# Patient Record
Sex: FEMALE | Race: WHITE | NOT HISPANIC OR LATINO
[De-identification: names, ages, dates, MRNs, and addresses within clinical notes are randomized per-mention and may not be internally consistent; named-entity substitution may affect disease eponyms.]

---

## 2024-03-11 DIAGNOSIS — E03.9 HYPOTHYROIDISM, UNSPECIFIED: ICD-10-CM

## 2024-03-12 ENCOUNTER — APPOINTMENT (OUTPATIENT)
Dept: CARDIOLOGY | Facility: CLINIC | Age: 79
End: 2024-03-12
Payer: MEDICARE

## 2024-03-12 VITALS
WEIGHT: 115 LBS | HEART RATE: 63 BPM | SYSTOLIC BLOOD PRESSURE: 165 MMHG | DIASTOLIC BLOOD PRESSURE: 77 MMHG | OXYGEN SATURATION: 99 % | BODY MASS INDEX: 26.61 KG/M2 | HEIGHT: 55 IN

## 2024-03-12 DIAGNOSIS — I10 ESSENTIAL (PRIMARY) HYPERTENSION: ICD-10-CM

## 2024-03-12 DIAGNOSIS — E78.5 HYPERLIPIDEMIA, UNSPECIFIED: ICD-10-CM

## 2024-03-12 PROCEDURE — 99213 OFFICE O/P EST LOW 20 MIN: CPT | Mod: 25

## 2024-03-12 PROCEDURE — 93000 ELECTROCARDIOGRAM COMPLETE: CPT

## 2024-03-12 RX ORDER — ALPRAZOLAM 0.5 MG/1
0.5 TABLET ORAL
Refills: 0 | Status: ACTIVE | COMMUNITY
Start: 2024-03-12

## 2024-03-12 RX ORDER — VALSARTAN 160 MG/1
160 TABLET, COATED ORAL
Qty: 90 | Refills: 3 | Status: ACTIVE | COMMUNITY
Start: 2024-03-12 | End: 1900-01-01

## 2024-03-12 RX ORDER — METOPROLOL SUCCINATE 25 MG/1
25 TABLET, EXTENDED RELEASE ORAL
Qty: 90 | Refills: 3 | Status: ACTIVE | COMMUNITY
Start: 2024-03-12

## 2024-03-12 RX ORDER — SERTRALINE HYDROCHLORIDE 50 MG/1
50 TABLET, FILM COATED ORAL DAILY
Refills: 0 | Status: ACTIVE | COMMUNITY
Start: 2024-03-12

## 2024-05-12 PROBLEM — E78.5 HYPERLIPIDEMIA: Status: ACTIVE | Noted: 2024-03-11

## 2024-05-12 PROBLEM — I10 BENIGN ESSENTIAL HYPERTENSION: Status: ACTIVE | Noted: 2024-03-11

## 2024-05-12 NOTE — HISTORY OF PRESENT ILLNESS
[FreeTextEntry1] : Here to consult re: HTN Dealing with stressful home situation with  and dementia No CP No LE edema No palps

## 2024-05-12 NOTE — DISCUSSION/SUMMARY
[FreeTextEntry1] : titrate valsartan discussed low salt diet [EKG obtained to assist in diagnosis and management of assessed problem(s)] : EKG obtained to assist in diagnosis and management of assessed problem(s)

## 2024-06-06 ENCOUNTER — TRANSCRIPTION ENCOUNTER (OUTPATIENT)
Age: 79
End: 2024-06-06

## 2024-06-06 ENCOUNTER — INPATIENT (INPATIENT)
Facility: HOSPITAL | Age: 79
LOS: 1 days | Discharge: HOME | DRG: 880 | End: 2024-06-08
Attending: INTERNAL MEDICINE | Admitting: INTERNAL MEDICINE
Payer: MEDICARE

## 2024-06-06 VITALS
TEMPERATURE: 99 F | RESPIRATION RATE: 17 BRPM | DIASTOLIC BLOOD PRESSURE: 80 MMHG | HEART RATE: 63 BPM | OXYGEN SATURATION: 99 % | SYSTOLIC BLOOD PRESSURE: 152 MMHG

## 2024-06-06 DIAGNOSIS — R41.82 ALTERED MENTAL STATUS, UNSPECIFIED: ICD-10-CM

## 2024-06-06 DIAGNOSIS — R41.89 OTHER SYMPTOMS AND SIGNS INVOLVING COGNITIVE FUNCTIONS AND AWARENESS: ICD-10-CM

## 2024-06-06 LAB
ALBUMIN SERPL ELPH-MCNC: 3.9 G/DL — SIGNIFICANT CHANGE UP (ref 3.3–5)
ALP SERPL-CCNC: 93 U/L — SIGNIFICANT CHANGE UP (ref 40–120)
ALT FLD-CCNC: 18 U/L — SIGNIFICANT CHANGE UP (ref 10–45)
ANION GAP SERPL CALC-SCNC: 14 MMOL/L — SIGNIFICANT CHANGE UP (ref 5–17)
APPEARANCE UR: CLEAR — SIGNIFICANT CHANGE UP
AST SERPL-CCNC: 22 U/L — SIGNIFICANT CHANGE UP (ref 10–40)
BACTERIA # UR AUTO: NEGATIVE /HPF — SIGNIFICANT CHANGE UP
BASE EXCESS BLDV CALC-SCNC: -1.2 MMOL/L — SIGNIFICANT CHANGE UP (ref -2–3)
BASOPHILS # BLD AUTO: 0.07 K/UL — SIGNIFICANT CHANGE UP (ref 0–0.2)
BASOPHILS NFR BLD AUTO: 0.9 % — SIGNIFICANT CHANGE UP (ref 0–2)
BILIRUB SERPL-MCNC: 0.2 MG/DL — SIGNIFICANT CHANGE UP (ref 0.2–1.2)
BILIRUB UR-MCNC: NEGATIVE — SIGNIFICANT CHANGE UP
BUN SERPL-MCNC: 18 MG/DL — SIGNIFICANT CHANGE UP (ref 7–23)
CA-I SERPL-SCNC: 1.25 MMOL/L — SIGNIFICANT CHANGE UP (ref 1.15–1.33)
CALCIUM SERPL-MCNC: 9.6 MG/DL — SIGNIFICANT CHANGE UP (ref 8.4–10.5)
CAST: 0 /LPF — SIGNIFICANT CHANGE UP (ref 0–4)
CHLORIDE BLDV-SCNC: 105 MMOL/L — SIGNIFICANT CHANGE UP (ref 96–108)
CHLORIDE SERPL-SCNC: 107 MMOL/L — SIGNIFICANT CHANGE UP (ref 96–108)
CO2 BLDV-SCNC: 25 MMOL/L — SIGNIFICANT CHANGE UP (ref 22–26)
CO2 SERPL-SCNC: 19 MMOL/L — LOW (ref 22–31)
COLOR SPEC: YELLOW — SIGNIFICANT CHANGE UP
CREAT SERPL-MCNC: 0.6 MG/DL — SIGNIFICANT CHANGE UP (ref 0.5–1.3)
DIFF PNL FLD: NEGATIVE — SIGNIFICANT CHANGE UP
EGFR: 91 ML/MIN/1.73M2 — SIGNIFICANT CHANGE UP
EOSINOPHIL # BLD AUTO: 0.15 K/UL — SIGNIFICANT CHANGE UP (ref 0–0.5)
EOSINOPHIL NFR BLD AUTO: 2 % — SIGNIFICANT CHANGE UP (ref 0–6)
FLUAV AG NPH QL: SIGNIFICANT CHANGE UP
FLUBV AG NPH QL: SIGNIFICANT CHANGE UP
GAS PNL BLDV: 136 MMOL/L — SIGNIFICANT CHANGE UP (ref 136–145)
GAS PNL BLDV: SIGNIFICANT CHANGE UP
GAS PNL BLDV: SIGNIFICANT CHANGE UP
GLUCOSE BLDV-MCNC: 86 MG/DL — SIGNIFICANT CHANGE UP (ref 70–99)
GLUCOSE SERPL-MCNC: 95 MG/DL — SIGNIFICANT CHANGE UP (ref 70–99)
GLUCOSE UR QL: NEGATIVE MG/DL — SIGNIFICANT CHANGE UP
HCO3 BLDV-SCNC: 24 MMOL/L — SIGNIFICANT CHANGE UP (ref 22–29)
HCT VFR BLD CALC: 42.5 % — SIGNIFICANT CHANGE UP (ref 34.5–45)
HCT VFR BLDA CALC: 41 % — SIGNIFICANT CHANGE UP (ref 34.5–46.5)
HGB BLD CALC-MCNC: 13.6 G/DL — SIGNIFICANT CHANGE UP (ref 11.7–16.1)
HGB BLD-MCNC: 13.8 G/DL — SIGNIFICANT CHANGE UP (ref 11.5–15.5)
IMM GRANULOCYTES NFR BLD AUTO: 0.5 % — SIGNIFICANT CHANGE UP (ref 0–0.9)
KETONES UR-MCNC: NEGATIVE MG/DL — SIGNIFICANT CHANGE UP
LACTATE BLDV-MCNC: 0.9 MMOL/L — SIGNIFICANT CHANGE UP (ref 0.5–2)
LEUKOCYTE ESTERASE UR-ACNC: NEGATIVE — SIGNIFICANT CHANGE UP
LYMPHOCYTES # BLD AUTO: 1.56 K/UL — SIGNIFICANT CHANGE UP (ref 1–3.3)
LYMPHOCYTES # BLD AUTO: 20.7 % — SIGNIFICANT CHANGE UP (ref 13–44)
MCHC RBC-ENTMCNC: 31.7 PG — SIGNIFICANT CHANGE UP (ref 27–34)
MCHC RBC-ENTMCNC: 32.5 GM/DL — SIGNIFICANT CHANGE UP (ref 32–36)
MCV RBC AUTO: 97.7 FL — SIGNIFICANT CHANGE UP (ref 80–100)
MONOCYTES # BLD AUTO: 0.83 K/UL — SIGNIFICANT CHANGE UP (ref 0–0.9)
MONOCYTES NFR BLD AUTO: 11 % — SIGNIFICANT CHANGE UP (ref 2–14)
NEUTROPHILS # BLD AUTO: 4.9 K/UL — SIGNIFICANT CHANGE UP (ref 1.8–7.4)
NEUTROPHILS NFR BLD AUTO: 64.9 % — SIGNIFICANT CHANGE UP (ref 43–77)
NITRITE UR-MCNC: NEGATIVE — SIGNIFICANT CHANGE UP
NRBC # BLD: 0 /100 WBCS — SIGNIFICANT CHANGE UP (ref 0–0)
PCO2 BLDV: 39 MMHG — SIGNIFICANT CHANGE UP (ref 39–42)
PH BLDV: 7.39 — SIGNIFICANT CHANGE UP (ref 7.32–7.43)
PH UR: 5.5 — SIGNIFICANT CHANGE UP (ref 5–8)
PLATELET # BLD AUTO: 326 K/UL — SIGNIFICANT CHANGE UP (ref 150–400)
PO2 BLDV: 60 MMHG — HIGH (ref 25–45)
POTASSIUM BLDV-SCNC: 3.9 MMOL/L — SIGNIFICANT CHANGE UP (ref 3.5–5.1)
POTASSIUM SERPL-MCNC: 3.8 MMOL/L — SIGNIFICANT CHANGE UP (ref 3.5–5.3)
POTASSIUM SERPL-SCNC: 3.8 MMOL/L — SIGNIFICANT CHANGE UP (ref 3.5–5.3)
PROT SERPL-MCNC: 7 G/DL — SIGNIFICANT CHANGE UP (ref 6–8.3)
PROT UR-MCNC: NEGATIVE MG/DL — SIGNIFICANT CHANGE UP
RBC # BLD: 4.35 M/UL — SIGNIFICANT CHANGE UP (ref 3.8–5.2)
RBC # FLD: 13.2 % — SIGNIFICANT CHANGE UP (ref 10.3–14.5)
RBC CASTS # UR COMP ASSIST: 0 /HPF — SIGNIFICANT CHANGE UP (ref 0–4)
RSV RNA NPH QL NAA+NON-PROBE: SIGNIFICANT CHANGE UP
SAO2 % BLDV: 92.3 % — HIGH (ref 67–88)
SARS-COV-2 RNA SPEC QL NAA+PROBE: SIGNIFICANT CHANGE UP
SODIUM SERPL-SCNC: 140 MMOL/L — SIGNIFICANT CHANGE UP (ref 135–145)
SP GR SPEC: 1.02 — SIGNIFICANT CHANGE UP (ref 1–1.03)
SQUAMOUS # UR AUTO: 0 /HPF — SIGNIFICANT CHANGE UP (ref 0–5)
UROBILINOGEN FLD QL: 1 MG/DL — SIGNIFICANT CHANGE UP (ref 0.2–1)
WBC # BLD: 7.55 K/UL — SIGNIFICANT CHANGE UP (ref 3.8–10.5)
WBC # FLD AUTO: 7.55 K/UL — SIGNIFICANT CHANGE UP (ref 3.8–10.5)
WBC UR QL: 1 /HPF — SIGNIFICANT CHANGE UP (ref 0–5)

## 2024-06-06 PROCEDURE — 99285 EMERGENCY DEPT VISIT HI MDM: CPT

## 2024-06-06 PROCEDURE — 71045 X-RAY EXAM CHEST 1 VIEW: CPT | Mod: 26

## 2024-06-06 PROCEDURE — 99223 1ST HOSP IP/OBS HIGH 75: CPT

## 2024-06-06 PROCEDURE — 70450 CT HEAD/BRAIN W/O DYE: CPT | Mod: 26,MC

## 2024-06-06 RX ORDER — ALPRAZOLAM 0.25 MG
0.25 TABLET ORAL ONCE
Refills: 0 | Status: DISCONTINUED | OUTPATIENT
Start: 2024-06-06 | End: 2024-06-06

## 2024-06-06 RX ORDER — METOPROLOL TARTRATE 50 MG
25 TABLET ORAL ONCE
Refills: 0 | Status: COMPLETED | OUTPATIENT
Start: 2024-06-06 | End: 2024-06-06

## 2024-06-06 RX ORDER — VALSARTAN 80 MG/1
160 TABLET ORAL ONCE
Refills: 0 | Status: COMPLETED | OUTPATIENT
Start: 2024-06-06 | End: 2024-06-06

## 2024-06-06 RX ADMIN — Medication 25 MILLIGRAM(S): at 22:47

## 2024-06-06 RX ADMIN — VALSARTAN 160 MILLIGRAM(S): 80 TABLET ORAL at 22:47

## 2024-06-06 RX ADMIN — Medication 0.25 MILLIGRAM(S): at 22:47

## 2024-06-06 NOTE — ED PROVIDER NOTE - QRS
92
I personally performed the service described in the documentation  recorded by the scribe in my presence, and it accurately and completely records my words and action.

## 2024-06-06 NOTE — H&P ADULT - PROBLEM SELECTOR PLAN 1
-dementia vs. metabolic encephalopathy.   -likely dementia given chronic ischemic changes on CTH, progressively worsening cognitive impairment. No infectious s/s and cbc and ua neg.   -monitor for now. -dementia vs. metabolic encephalopathy.   -likely dementia given chronic ischemic changes on CTH, progressively worsening cognitive impairment. No infectious s/s and cbc and ua neg.   -monitor for now.  -f/u TSH

## 2024-06-06 NOTE — ED ADULT NURSE REASSESSMENT NOTE - NS ED NURSE REASSESS COMMENT FT1
Patient found in stretcher breathing spontaneously and unlabored on Room Air. No signs of respiratory distress @ this time. The patient is alert and orientated x4 and responds appropriately. The patient presents with a Left 20G PIV that is C/D/I and saline locked @ this time. Pt denies chest pain, palpitations, shortness of breath, headache, visual disturbances, numbness/tingling, fever, chills, diaphoresis,  nausea, vomiting, constipation, diarrhea, or urinary symptoms. Safety and comfort measures provided, bed locked and in lowest position, side rails up for safety. VS to order and Awaiting return from CT Scan.
Rectal temp obtained, 99.1. Pt repositioned to facilitate comfort.

## 2024-06-06 NOTE — ED PROVIDER NOTE - RAPID ASSESSMENT
79-year-old female brought into the emergency department by her family for worsening altered mental status over the last 3 days.  According to family she normally is very with it however has been questioning whether or not she has been taking her medication seems more confused which is very atypical for her. 79-year-old female brought into the emergency department by her family for worsening altered mental status over the last 3 days.  According to family she normally is very with it however has been questioning whether or not she has been taking her medication seems more confused which is very atypical for her.  Patient was rapidly assessed via a telemedicine and/or role of Quick Triage Doctor; a limited history, physical exam and assessment was performed. The patient will be seen and further evaluated in the main emergency department. The remainder of care and evaluation will be conducted by the primary emergency medicine team. Receiving team will follow up on labs, imaging and serially reassess patient as indicated. All further decisions regarding patient care, evaluation and disposition are at the discretion of the receiving primary emergency department team.

## 2024-06-06 NOTE — H&P ADULT - HISTORY OF PRESENT ILLNESS
79F PMHx HTN, anxiety. Brought in by family for AMS for the past few months, acutely worsened past 3days; she intermittently gets confused and does not remember what happened.     In the ED, VSS.   CBC. coag, cmp, UA unremarkable. limited rvp neg. CTH w/ chronic ischemic changes, no acute path. CXR clear lungs.  79F PMHx HTN, anxiety. Brought in by family for AMS for the past few months, acutely worsened past 3days; she intermittently gets confused and does not remember what happened.     In the ED, VSS.   CBC. coag, cmp, UA unremarkable. limited rvp neg. CTH w/ chronic ischemic changes, no acute path. CXR clear lungs.     On interview, pt is not in distress. Makes eye contact but is not very communicative, answering yes and no only.

## 2024-06-06 NOTE — ED PROVIDER NOTE - PROGRESS NOTE DETAILS
Pascual Liang MD: EKG performed at 4:34 PM personally reviewed by me, showing sinus arrhythmia with first degree AV block with a ventricular rate of 62 bpm, AR interval of 248 ms, QRS duration of 92 ms, QTc (Baz) of 462 ms. there are no ST segment changes and no T wave abnormalities. Aristeo Liang MD: Case discussed with Dr. Cleveland, he is comfortable with patient being admitted to his service as long as there are no significant abnormalities on the CT head or the blood gas. Maurice Blanco MD PGY2: pt signed out to me at signout. cth now no acute findings. dw dr leahy, to admit. lashaun pt and family, to give home nighttime meds.

## 2024-06-06 NOTE — H&P ADULT - NSHPPHYSICALEXAM_GEN_ALL_CORE
Vital Signs Last 24 Hrs  T(C): 37 (06 Jun 2024 23:45), Max: 37.3 (06 Jun 2024 17:33)  T(F): 98.6 (06 Jun 2024 23:45), Max: 99.1 (06 Jun 2024 17:33)  HR: 54 (06 Jun 2024 23:45) (54 - 66)  BP: 145/71 (06 Jun 2024 23:45) (130/57 - 152/80)  BP(mean): 79 (06 Jun 2024 22:45) (79 - 99)  RR: 18 (06 Jun 2024 23:45) (16 - 19)  SpO2: 96% (06 Jun 2024 23:45) (96% - 100%)    Parameters below as of 06 Jun 2024 23:45  Patient On (Oxygen Delivery Method): room air        CONSTITUTIONAL: Well-groomed, in no apparent distress  EYES: No conjunctival or scleral injection, non-icteric  ENMT: No external nasal lesions; MMM  RESPIRATORY: Breathing comfortably; lungs CTA without wheeze/rhonchi/rales  CARDIOVASCULAR: +S1S2, RRR; no lower extremity edema  GASTROINTESTINAL: No tenderness, +BS throughout, no rebound/guarding  NEUROLOGIC: No gross focal neurological deficits, alert and answers questions with yes/no, follows commands.

## 2024-06-06 NOTE — ED PROVIDER NOTE - CLINICAL SUMMARY MEDICAL DECISION MAKING FREE TEXT BOX
79-year-old female here with waxing and waning mental status for several months.  She was evaluated by the triage PA, initial labs are unremarkable.  Will obtain rectal temperature, check EKG as she has apparent arrhythmia on the cardiac monitor.  Will obtain CT head, will speak with primary doctor. 79-year-old female here with waxing and waning mental status for several months.  She was evaluated by the triage PA, initial labs are unremarkable.  Will obtain rectal temperature, check EKG as she has apparent arrhythmia on the cardiac monitor.  Will obtain CT head, will speak with primary doctor.    Attending Baldemar: 78 y/o F w/ PMH of HTN, anxiety, presents emergency department due to worsening mental status for the past several months. Seen w/ son and daughter. Family states that she intermittently gets confused, does not remember what just happened.  She has no measured fevers at home.  She reports no specific symptoms at this time including chest pain, nausea, vomiting, abdominal pain, shortness of breath. Pt overall well appearing, no acute distress. Lungs clear. HR regular. Abd nondistended/soft/nontender. A&O x3, able to make needs known. Non focal neuro exam. Eval for metabolic vs. infectious abnormalities. Obtain screening CTH to eval for acute intracrnial pathology. Reportedly referred in by PCP Dr. Cleveland, will speak w/ him. Plan for labs, imaging, EKG, meds PRN. Will reassess the need for additional interventions as clinically warranted. Refer to any progress notes for updates on clinical course and as a continuation of this MDM.     I, Dr. Christiano Mcdermott, independently interpreted the EKG which showed Sinus arrhythmia w/ 1st degree AV block at a rate of 62.

## 2024-06-06 NOTE — ED ADULT NURSE NOTE - OBJECTIVE STATEMENT
Pt presents to ED from home. Pt complains of, "feeling confused xlast few days and feeling out of it". Pt has daughter and son at bedside that expressed concerns for pt claiming, "she is very forgetful lately, forgetting about taking her medication, which is unlike her". Pt denies chest pain, chest palpitations, SOB, n/v/d, fever, chills, difficulty urinating. Pt presents as AAOx4- answering all questions correctly, but is anxious. Pt placed on cardiac monitor, NSR. Plan of care and monitoring discussed with pt. Bed locked and lowered for safety. Pt provided with call bell for assistance.

## 2024-06-06 NOTE — ED PROVIDER NOTE - PHYSICAL EXAMINATION
General: no apparent distress  Psych: mood appropriate  Head: normocephalic; atraumatic  Eyes: conjunctivae clear bilaterally, sclerae anicteric  ENT: no nasal flaring, patent nares  Cardio: RRR, no m/r/g, pulses 2+ b/l  Resp: CATB, no w/r/r  GI: soft/nondistended/nontender  Neuro: normal sensation, moving all four extremities equally  Skin: No evidence of rash or bruising  MSK: normal movement of all extremities  Lymph/Vasc: no LE edema

## 2024-06-06 NOTE — ED PROVIDER NOTE - ATTENDING CONTRIBUTION TO CARE
Attending Baldemar: I performed a history and physical exam of the patient and discussed their management with the resident/fellow/student. I have reviewed the resident/fellow/student note and agree with the documented findings and plan of care, except as noted. I have personally performed a substantive portion of the visit including all aspects of the medical decision making. My medical decision making and observations are found above. Please refer to any progress notes for updates on clinical course. My notes supersedes the above resident/fellow/student note in case of discrepancy

## 2024-06-06 NOTE — H&P ADULT - NSHPLABSRESULTS_GEN_ALL_CORE
13.8   7.55  )-----------( 326      ( 2024 15:18 )             42.5       -    140  |  107  |  18  ----------------------------<  95  3.8   |  19<L>  |  0.60    Ca    9.6      2024 15:18    TPro  7.0  /  Alb  3.9  /  TBili  0.2  /  DBili  x   /  AST  22  /  ALT  18  /  AlkPhos  93  06-06              Urinalysis Basic - ( 2024 15:19 )    Color: Yellow / Appearance: Clear / S.019 / pH: x  Gluc: x / Ketone: Negative mg/dL  / Bili: Negative / Urobili: 1.0 mg/dL   Blood: x / Protein: Negative mg/dL / Nitrite: Negative   Leuk Esterase: Negative / RBC: 0 /HPF / WBC 1 /HPF   Sq Epi: x / Non Sq Epi: 0 /HPF / Bacteria: Negative /HPF                  CAPILLARY BLOOD GLUCOSE

## 2024-06-06 NOTE — ED PROVIDER NOTE - OBJECTIVE STATEMENT
79-year-old female with past medical history of hypertension, anxiety, presents emergency department due to worsening mental status for the past several months.  Family states that she intermittently gets confused, does not remember what just happened.  She has no measured fevers at home.  She reports no symptoms at this time including chest pain, nausea, vomiting, abdominal pain, shortness of breath.

## 2024-06-07 ENCOUNTER — RESULT REVIEW (OUTPATIENT)
Age: 79
End: 2024-06-07

## 2024-06-07 DIAGNOSIS — Z29.9 ENCOUNTER FOR PROPHYLACTIC MEASURES, UNSPECIFIED: ICD-10-CM

## 2024-06-07 DIAGNOSIS — E78.5 HYPERLIPIDEMIA, UNSPECIFIED: ICD-10-CM

## 2024-06-07 DIAGNOSIS — I10 ESSENTIAL (PRIMARY) HYPERTENSION: ICD-10-CM

## 2024-06-07 DIAGNOSIS — E03.9 HYPOTHYROIDISM, UNSPECIFIED: ICD-10-CM

## 2024-06-07 LAB
ANION GAP SERPL CALC-SCNC: 12 MMOL/L — SIGNIFICANT CHANGE UP (ref 5–17)
BUN SERPL-MCNC: 16 MG/DL — SIGNIFICANT CHANGE UP (ref 7–23)
CALCIUM SERPL-MCNC: 9.4 MG/DL — SIGNIFICANT CHANGE UP (ref 8.4–10.5)
CHLORIDE SERPL-SCNC: 107 MMOL/L — SIGNIFICANT CHANGE UP (ref 96–108)
CO2 SERPL-SCNC: 22 MMOL/L — SIGNIFICANT CHANGE UP (ref 22–31)
CREAT SERPL-MCNC: 0.57 MG/DL — SIGNIFICANT CHANGE UP (ref 0.5–1.3)
CULTURE RESULTS: SIGNIFICANT CHANGE UP
EGFR: 92 ML/MIN/1.73M2 — SIGNIFICANT CHANGE UP
GLUCOSE SERPL-MCNC: 88 MG/DL — SIGNIFICANT CHANGE UP (ref 70–99)
HCT VFR BLD CALC: 42.1 % — SIGNIFICANT CHANGE UP (ref 34.5–45)
HGB BLD-MCNC: 13.4 G/DL — SIGNIFICANT CHANGE UP (ref 11.5–15.5)
MAGNESIUM SERPL-MCNC: 2.4 MG/DL — SIGNIFICANT CHANGE UP (ref 1.6–2.6)
MCHC RBC-ENTMCNC: 31.5 PG — SIGNIFICANT CHANGE UP (ref 27–34)
MCHC RBC-ENTMCNC: 31.8 GM/DL — LOW (ref 32–36)
MCV RBC AUTO: 99.1 FL — SIGNIFICANT CHANGE UP (ref 80–100)
NRBC # BLD: 0 /100 WBCS — SIGNIFICANT CHANGE UP (ref 0–0)
PHOSPHATE SERPL-MCNC: 3.3 MG/DL — SIGNIFICANT CHANGE UP (ref 2.5–4.5)
PLATELET # BLD AUTO: 317 K/UL — SIGNIFICANT CHANGE UP (ref 150–400)
POTASSIUM SERPL-MCNC: 3.7 MMOL/L — SIGNIFICANT CHANGE UP (ref 3.5–5.3)
POTASSIUM SERPL-SCNC: 3.7 MMOL/L — SIGNIFICANT CHANGE UP (ref 3.5–5.3)
RBC # BLD: 4.25 M/UL — SIGNIFICANT CHANGE UP (ref 3.8–5.2)
RBC # FLD: 13.3 % — SIGNIFICANT CHANGE UP (ref 10.3–14.5)
SODIUM SERPL-SCNC: 141 MMOL/L — SIGNIFICANT CHANGE UP (ref 135–145)
SPECIMEN SOURCE: SIGNIFICANT CHANGE UP
TSH SERPL-MCNC: 3.55 UIU/ML — SIGNIFICANT CHANGE UP (ref 0.27–4.2)
WBC # BLD: 7.85 K/UL — SIGNIFICANT CHANGE UP (ref 3.8–10.5)
WBC # FLD AUTO: 7.85 K/UL — SIGNIFICANT CHANGE UP (ref 3.8–10.5)

## 2024-06-07 PROCEDURE — 70551 MRI BRAIN STEM W/O DYE: CPT | Mod: 26

## 2024-06-07 PROCEDURE — 93306 TTE W/DOPPLER COMPLETE: CPT | Mod: 26

## 2024-06-07 PROCEDURE — 99221 1ST HOSP IP/OBS SF/LOW 40: CPT

## 2024-06-07 PROCEDURE — 95816 EEG AWAKE AND DROWSY: CPT | Mod: 26

## 2024-06-07 RX ORDER — ALPRAZOLAM 0.25 MG
0.25 TABLET ORAL AT BEDTIME
Refills: 0 | Status: DISCONTINUED | OUTPATIENT
Start: 2024-06-07 | End: 2024-06-07

## 2024-06-07 RX ORDER — ALPRAZOLAM 0.25 MG
0.5 TABLET ORAL
Refills: 0 | Status: DISCONTINUED | OUTPATIENT
Start: 2024-06-07 | End: 2024-06-07

## 2024-06-07 RX ORDER — ONDANSETRON 8 MG/1
4 TABLET, FILM COATED ORAL EVERY 8 HOURS
Refills: 0 | Status: DISCONTINUED | OUTPATIENT
Start: 2024-06-07 | End: 2024-06-08

## 2024-06-07 RX ORDER — ACETAMINOPHEN 500 MG
650 TABLET ORAL EVERY 6 HOURS
Refills: 0 | Status: DISCONTINUED | OUTPATIENT
Start: 2024-06-07 | End: 2024-06-08

## 2024-06-07 RX ORDER — SERTRALINE 25 MG/1
50 TABLET, FILM COATED ORAL DAILY
Refills: 0 | Status: DISCONTINUED | OUTPATIENT
Start: 2024-06-07 | End: 2024-06-08

## 2024-06-07 RX ORDER — ATORVASTATIN CALCIUM 80 MG/1
20 TABLET, FILM COATED ORAL AT BEDTIME
Refills: 0 | Status: DISCONTINUED | OUTPATIENT
Start: 2024-06-07 | End: 2024-06-08

## 2024-06-07 RX ORDER — METOPROLOL TARTRATE 50 MG
25 TABLET ORAL DAILY
Refills: 0 | Status: DISCONTINUED | OUTPATIENT
Start: 2024-06-07 | End: 2024-06-07

## 2024-06-07 RX ORDER — HEPARIN SODIUM 5000 [USP'U]/ML
5000 INJECTION INTRAVENOUS; SUBCUTANEOUS EVERY 8 HOURS
Refills: 0 | Status: DISCONTINUED | OUTPATIENT
Start: 2024-06-07 | End: 2024-06-08

## 2024-06-07 RX ORDER — ASPIRIN/CALCIUM CARB/MAGNESIUM 324 MG
81 TABLET ORAL DAILY
Refills: 0 | Status: DISCONTINUED | OUTPATIENT
Start: 2024-06-07 | End: 2024-06-08

## 2024-06-07 RX ORDER — THYROID 120 MG
60 TABLET ORAL DAILY
Refills: 0 | Status: DISCONTINUED | OUTPATIENT
Start: 2024-06-07 | End: 2024-06-08

## 2024-06-07 RX ORDER — CLONAZEPAM 1 MG
0.25 TABLET ORAL
Refills: 0 | Status: DISCONTINUED | OUTPATIENT
Start: 2024-06-07 | End: 2024-06-08

## 2024-06-07 RX ORDER — LANOLIN ALCOHOL/MO/W.PET/CERES
3 CREAM (GRAM) TOPICAL AT BEDTIME
Refills: 0 | Status: DISCONTINUED | OUTPATIENT
Start: 2024-06-07 | End: 2024-06-08

## 2024-06-07 RX ORDER — VALSARTAN 80 MG/1
160 TABLET ORAL
Refills: 0 | Status: DISCONTINUED | OUTPATIENT
Start: 2024-06-07 | End: 2024-06-08

## 2024-06-07 RX ADMIN — SERTRALINE 50 MILLIGRAM(S): 25 TABLET, FILM COATED ORAL at 11:37

## 2024-06-07 RX ADMIN — ATORVASTATIN CALCIUM 20 MILLIGRAM(S): 80 TABLET, FILM COATED ORAL at 21:01

## 2024-06-07 RX ADMIN — Medication 25 MILLIGRAM(S): at 05:13

## 2024-06-07 RX ADMIN — VALSARTAN 160 MILLIGRAM(S): 80 TABLET ORAL at 05:13

## 2024-06-07 RX ADMIN — HEPARIN SODIUM 5000 UNIT(S): 5000 INJECTION INTRAVENOUS; SUBCUTANEOUS at 21:01

## 2024-06-07 RX ADMIN — Medication 0.25 MILLIGRAM(S): at 17:41

## 2024-06-07 RX ADMIN — Medication 81 MILLIGRAM(S): at 11:37

## 2024-06-07 RX ADMIN — VALSARTAN 160 MILLIGRAM(S): 80 TABLET ORAL at 17:41

## 2024-06-07 RX ADMIN — Medication 60 MILLIGRAM(S): at 05:13

## 2024-06-07 NOTE — DISCHARGE NOTE PROVIDER - NSDCCPCAREPLAN_GEN_ALL_CORE_FT
PRINCIPAL DISCHARGE DIAGNOSIS  Diagnosis: Altered mental status  Assessment and Plan of Treatment:   CT Head  Unremarkable head CT. Ischemic white matter disease and atrophy typical for age. Intracranial atherosclerosis.  TTE unremarkable.  MRI Head  No evidence for intracranial mass, acute territorial infarct, acute intracranial hemorrhage, or midline shift.  Recommended to start clonazepam 0.25mg po BID for extended effect with goal to taper off, continue home sertraline and follow-up in geriatric psychiatric clinic.  Please follow up outpatient with PCP and psychiatry.         SECONDARY DISCHARGE DIAGNOSES  Diagnosis: Hypothyroid  Assessment and Plan of Treatment: Continue on [place] Thyroid and follow up with PCP.    Diagnosis: Hyperlipidemia  Assessment and Plan of Treatment: Continue on Lipitor and follow up with PCP.    Diagnosis: Hypertension  Assessment and Plan of Treatment: Continue on Toprol and valsartan.

## 2024-06-07 NOTE — BH CONSULTATION LIAISON ASSESSMENT NOTE - RISK ASSESSMENT
Patient is at low acute risk of suicide - acute risk factors include worsening anxiety, hx of depression. Chronic risk factors include hx of depression and anxiety. Protective risk factors include stable residence, stable relationships with supportive family, compliance with treatment.

## 2024-06-07 NOTE — BH CONSULTATION LIAISON ASSESSMENT NOTE - DIFFERENTIAL
anxiety could be primary anxiety disorder (NICHELLE), adjustment disorder, panic attacks. Also consider dementia, delirium.

## 2024-06-07 NOTE — BH CONSULTATION LIAISON ASSESSMENT NOTE - CURRENT MEDICATION
MEDICATIONS  (STANDING):  ALPRAZolam 0.5 milliGRAM(s) Oral two times a day  aspirin enteric coated 81 milliGRAM(s) Oral daily  atorvastatin 20 milliGRAM(s) Oral at bedtime  heparin   Injectable 5000 Unit(s) SubCutaneous every 8 hours  sertraline 50 milliGRAM(s) Oral daily  thyroid 60 milliGRAM(s) Oral daily  valsartan 160 milliGRAM(s) Oral two times a day    MEDICATIONS  (PRN):  acetaminophen     Tablet .. 650 milliGRAM(s) Oral every 6 hours PRN Temp greater or equal to 38C (100.4F), Mild Pain (1 - 3)  aluminum hydroxide/magnesium hydroxide/simethicone Suspension 30 milliLiter(s) Oral every 4 hours PRN Dyspepsia  melatonin 3 milliGRAM(s) Oral at bedtime PRN Insomnia  ondansetron Injectable 4 milliGRAM(s) IV Push every 8 hours PRN Nausea and/or Vomiting

## 2024-06-07 NOTE — BH CONSULTATION LIAISON ASSESSMENT NOTE - NSBHCHARTREVIEWINVESTIGATE_PSY_A_CORE FT
6/6 CT Head: Unremarkable head CT. Ischemic white matter disease and atrophy typical for age. Intracranial atherosclerosis.

## 2024-06-07 NOTE — DISCHARGE NOTE PROVIDER - HOSPITAL COURSE
79F PMHx HTN, anxiety. Brought in by family for AMS for the past few months, acutely worsened past 3days; she intermittently gets confused and does not remember what happened.     In the ED, VSS.   CBC. coag, cmp, UA unremarkable. limited rvp neg. CTH w/ chronic ischemic changes, no acute path. CXR clear lungs.     On interview, pt is not in distress. Makes eye contact but is not very communicative, answering yes and no only.       Problem/Plan - 1:  ·  Problem: Cognitive impairment.   ·  Plan: -dementia vs. metabolic encephalopathy.   -likely dementia given chronic ischemic changes on CTH, progressively worsening cognitive impairment. No infectious s/s and cbc and ua neg.   -Improved     Problem/Plan - 2:  ·  Problem: Hypothyroidism.   ·  Plan: -c/w armour thyroid.     Problem/Plan - 3:  ·  Problem: Hyperlipidemia.   ·  Plan: -c/w atorva.     Problem/Plan - 4:  ·  Problem: Hypertension.   ·  Plan: -c/w toprol and valsartan. 79F PMHx HTN, anxiety. Brought in by family for AMS for the past few months, acutely worsened past 3days; she intermittently gets confused and does not remember what happened. Admitted with AMS.     CT Head  Unremarkable head CT. Ischemic white matter disease and atrophy typical for age. Intracranial atherosclerosis.    TTE unremarkable.    MRI Head  No evidence for intracranial mass, acute territorial infarct, acute intracranial hemorrhage, or midline shift.    Evaluated by neurology and psych.   Patient with hx of anxiety prescribed sertraline and Xanax 0.5mg starting 08/2023 - anxiety recently exacerbated with what appears to be panic attacks in setting of recent cognitive impairment, stress at home related to health of , possibly withdrawal from short-acting xanax in between doses. Recommended to start clonazepam 0.25mg po BID for extended effect with goal to taper off, continue home sertraline and follow-up in geriatric psychiatric clinic.  Patient now medically cleared and to follow up outpatient with PCP and psych.

## 2024-06-07 NOTE — BH CONSULTATION LIAISON ASSESSMENT NOTE - NSBHCONSULTRECOMMENDOTHER_PSY_A_CORE FT
Would consider stopping xanax in favor of clonazepam 0.5mg po BID.  Would recommend MOCA Would consider stopping xanax in favor of clonazepam 0.5mg po BID  Continue home sertraline  Would recommend MOCA   Would consider stopping xanax in favor of clonazepam 0.25mg po BID. Increase to 0.5mg po BID if anxiety uncontrolled.  Continue home sertraline  Would recommend MOCA

## 2024-06-07 NOTE — PATIENT PROFILE ADULT - FALL HARM RISK - UNIVERSAL INTERVENTIONS
Bed in lowest position, wheels locked, appropriate side rails in place/Call bell, personal items and telephone in reach/Instruct patient to call for assistance before getting out of bed or chair/Non-slip footwear when patient is out of bed/[place] to call system/Physically safe environment - no spills, clutter or unnecessary equipment/Purposeful Proactive Rounding/Room/bathroom lighting operational, light cord in reach

## 2024-06-07 NOTE — EEG REPORT - NS EEG TEXT BOX
REPORT OF ROUTINE EEG WITH VIDEO    [facility]: [address], [address] [place] [address], Phone: [phone]  [facility]: [address] [place] [address], [place] [address], Phone: [phone]  Office: [address] [place] [address], [place] [address], [place] [address], Phone: [phone]    Patient Name: Christy Rojas    Age: 79 year  : 1945    EEG #: [id number]  Study Date: 2024   Start Time: 12:00:18 PM     Study Duration: 20.3 min  		    Technical Information:					  On Instrument: [id number]  Placement and Labeling of Electrodes:  The EEG was performed utilizing 20 channels referential EEG connections (coronal over temporal over parasagittal montage) using all standard 10-20 electrode placements with EKG.  Recording was at a sampling rate of 256 samples per second per channel.  Time synchronized digital video recording was done simultaneously with EEG recording.  A low light infrared camera was used for low light recording.  Milo and seizure detection algorithms were utilized.  CSA Technical Component:  Quantitative EEG analysis using a separate Compressed Spectral Array (CSA) software package was conducted in real-time and run at bedside after set up by the technician, digitally displaying the power of electrographic frequencies included in the 1-30Hz band using a graded color map.  This data was reviewed and interpreted independently, and is reported in a separate section below.    History:  -79 year old Female is here to rule out seizures      Medication  Aspirin    Zoloft (Sertraline)    Melatonin    Xanax    Tylenol      Study Interpretation:    FINDINGS:  The background was continuous, spontaneously variable and reactive.  During wakefulness, the posteriorly dominant rhythm consisted of symmetric, well modulated 9-10 Hz activity, with an amplitude to 30 uV, that attenuated to eye opening.  Low amplitude central beta was noted in wakefulness.    Background Slowing:  Generalized slowing: none was present.  Focal slowing: none was present.    Sleep Background:  -Drowsiness was characterized by fragmentation, attenuation, and slowing of the background activity.    -Stage II sleep transients were not recorded.    Non-epileptiform activity:  None.    Epileptiform Activity:   No epileptiform discharges were present.    Events:  No clinical events were recorded.  No seizures were recorded.    Activation Procedures:   -Hyperventilation was not performed.    -Photic stimulation was not performed.    Artifacts:  Intermittent myogenic and movement artifacts were noted.    ECG:  Irregular rhythm.    EEG Classification / Summary:    Normal EEG in the awake, and drowsy states.    Clinical Impression:    This is normal EEG study.    No epileptiform pattern or seizure recorded.    Noted irregular rhythm on EKG    ________________________________________    RIGOBERTO Rashid  Attending Physician, [facility] [place]    ------------------------------------  EEG Reading Room: [phone]  On Call Service After Hours: [phone]      REPORT OF ROUTINE EEG WITH VIDEO    [facility]: [address], [address] [place] [address], Phone: [phone]  [facility]: [address] [place] [address], [place] [address], Phone: [phone]  Office: [address] [place] [address], [place] [address], [place] [address], Phone: [phone]    Patient Name: Christy Rojas    Age: 79 year  : 1945    EEG #: [id number]  Study Date: 2024   Start Time: 12:00:18 PM     Study Duration: 20.3 min  		    Technical Information:					  On Instrument: [id number]  Placement and Labeling of Electrodes:  The EEG was performed utilizing 20 channels referential EEG connections (coronal over temporal over parasagittal montage) using all standard 10-20 electrode placements with EKG.  Recording was at a sampling rate of 256 samples per second per channel.  Time synchronized digital video recording was done simultaneously with EEG recording.  A low light infrared camera was used for low light recording.  Milo and seizure detection algorithms were utilized.  CSA Technical Component:  Quantitative EEG analysis using a separate Compressed Spectral Array (CSA) software package was conducted in real-time and run at bedside after set up by the technician, digitally displaying the power of electrographic frequencies included in the 1-30Hz band using a graded color map.  This data was reviewed and interpreted independently, and is reported in a separate section below.    History:  -79 year old Female is here to rule out seizures      Medication  Aspirin    Zoloft (Sertraline)    Melatonin    Xanax    Tylenol      Study Interpretation:    FINDINGS:  The background was continuous, spontaneously variable and reactive.  During wakefulness, the posteriorly dominant rhythm consisted of symmetric, well modulated 9-10 Hz activity, with an amplitude to 30 uV, that attenuated to eye opening.  Low amplitude central beta was noted in wakefulness.    Background Slowing:  Generalized slowing: none was present.  Focal slowing: none was present.    Sleep Background:  -Drowsiness was characterized by fragmentation, attenuation, and slowing of the background activity.    -Stage II sleep transients were not recorded.    Non-epileptiform activity:  None.    Epileptiform Activity:   No epileptiform discharges were present.    Events:  No clinical events were recorded.  No seizures were recorded.    Activation Procedures:   -Hyperventilation was not performed.    -Photic stimulation was performed and did not elicit any abnormalities.    Artifacts:  Intermittent myogenic and movement artifacts were noted.    ECG:  Irregular rhythm.    EEG Classification / Summary:    Normal EEG in the awake, and drowsy states.    Clinical Impression:    This is normal EEG study.    No epileptiform pattern or seizure recorded.    Noted irregular rhythm on EKG    ________________________________________    RIGOBERTO Rashid  Attending Physician, [facility]    ------------------------------------  EEG Reading Room: [phone]  On Call Service After Hours: [phone]

## 2024-06-07 NOTE — BH CONSULTATION LIAISON ASSESSMENT NOTE - NSBHATTESTCOMMENTATTENDFT_PSY_A_CORE
Pt is a 79 F, domiciled with , with home health aid, with at least one son, past psychiatric hx of depression and anxiety, no known psychiatric hospitalizations, no known SA or NSSIB, no known substance use, no known legal issues or hx of violence, past medical hx of HTN/HLD and hypothyroidism, who presented to the ED on 6/6 with AMS now admitted to medicine. Psychiatry consulted for management of anxiety.    Patient with hx of anxiety prescribed sertraline and Xanax 0.5mg starting 08/2023 - anxiety recently exacerbated with what appears to be panic attacks in setting of recent cognitive impairment, stress at home related to health of , possibly withdrawal from short-acting xanax in between doses. Attempted to do a MOCA test with pt but she refused stating that she did not want to talk today.   Would consider clonazepam 0.25mg po BID for extended effect with goal to taper off and recommend follow-up in geriatric psychiatric clinic.  Discussed risks of benzodiazepines with her daughter and son and recommended tapering Clonazepam as per outpt psychiatrist.    Gave numbers for the [facility] [phone] and walk in clinic at [phone]  Also gave number for a private psychiatrist Dr Colon [phone]

## 2024-06-07 NOTE — BH CONSULTATION LIAISON ASSESSMENT NOTE - NSBHCONSULTFOLLOWAFTERCARE_PSY_A_CORE FT
Gave numbers for the [facility] at [facility] [phone] and walk in clinic at [phone]  Also gave number for a private psychiatrist Dr Colon [phone]

## 2024-06-07 NOTE — BH CONSULTATION LIAISON ASSESSMENT NOTE - HPI (INCLUDE ILLNESS QUALITY, SEVERITY, DURATION, TIMING, CONTEXT, MODIFYING FACTORS, ASSOCIATED SIGNS AND SYMPTOMS)
Christy Rojas is a 79 F, domiciled with , with home health aid, with at least one son, past psychiatric hx of depression and anxiety, no known psychiatric hospitalizations, no known SA or NSSIB, no known substance use, no known legal issues or hx of violence, past medical hx of HTN/HLD and hypothyroidism, who presented to the ED on 6/6 with AMS now admitted to medicine. Psychiatry consulted for management of anxiety.    Per primary team, patient with lightheaded episodes, severe anxiety, hyperventilation, hallucinations being worked up for dementia. Patient with worsening mental status for the past several months.  Family states that she intermittently gets confused, does not remember what just happened.  She has no measured fevers at home.  She reports no symptoms at this time including chest pain, nausea, vomiting, abdominal pain, shortness of breath.    Patient interviewed lying in bed in transit to MRI imaging. Patient was initially calm but became increasingly more anxious during interview, saying that she does not want to talk further. Patient was notably stuttering throughout the interview. 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 Patient endorses feeling more anxious and confused lately. Patient being worked up for dementia, head CT remarkable for intracranial atherosclerosis. Per [facility], patient is prescribed xanax 0.5mg (last dispensed 04/2024 30 tablets for 30-day supply) and has been taking since 08/2023.

## 2024-06-07 NOTE — BH CONSULTATION LIAISON ASSESSMENT NOTE - NSBHCHARTREVIEWLAB_PSY_A_CORE FT
13.4   7.85  )-----------( 317      ( 07 Jun 2024 07:23 )             42.1       06-07    141  |  107  |  16  ----------------------------<  88  3.7   |  22  |  0.57    Ca    9.4      07 Jun 2024 06:46  Phos  3.3     06-07  Mg     2.4     06-07    TPro  7.0  /  Alb  3.9  /  TBili  0.2  /  DBili  x   /  AST  22  /  ALT  18  /  AlkPhos  93  06-06              Urinalysis Basic - ( 07 Jun 2024 06:46 )    Color: x / Appearance: x / SG: x / pH: x  Gluc: 88 mg/dL / Ketone: x  / Bili: x / Urobili: x   Blood: x / Protein: x / Nitrite: x   Leuk Esterase: x / RBC: x / WBC x   Sq Epi: x / Non Sq Epi: x / Bacteria: x                  CAPILLARY BLOOD GLUCOSE

## 2024-06-07 NOTE — CONSULT NOTE ADULT - ASSESSMENT
Bradycardia   possible from PACs not picked up on pulse ox   would benefit from Tele to make sure if HR truly is sinus esteban or falsely low from pacs  echo unremarkable    HTN  BP labile  for now cont current meds    AMS  work up as per neurology / medicine / psych   normal TSH
79F with HLD, HTN, anxiety brought in by family for AMS for the past few months, acutely worsened past 3days; she intermittently gets confused and does not remember what happened. family states she has some delusions as well.  occasional hallucinations.  hyperventilates at times.  has had episodes of blank stare but no seiuzre like activity.  has been stress at home given husbands condition   [facility]   [facility]     Impression:   AMS, likely anxiety related vs panic attack. will r/o other organic causes    - b12, RPR, TSH   - MRI brain w/o  - routine EEG  - psych eval.  for anxiety   - would consider clonazepam for anxiety   - PT/OT   - delerium precautisons   - check FS, glucose control <180  - GI/DVT ppx  - Counseling on diet, exercise, and medication adherence was done  - Counseling on smoking cessation and alcohol consumption offered when appropriate.  - Pain assessed and judicious use of narcotics when appropriate was discussed.    - Stroke education given when appropriate.  - Importance of fall prevention discussed.   - Differential diagnosis and plan of care discussed with patient and/or family and primary team  - Thank you for allowing me to participate in the care of this patient. Call with questions.   - spoke chato son abdullahi and his siblings with patient at [facility] 6/7   Alfonso Vasquez MD  Vascular Neurology  Office: [phone]

## 2024-06-07 NOTE — BH CONSULTATION LIAISON ASSESSMENT NOTE - NSBHCHARTREVIEWVS_PSY_A_CORE FT
Vital Signs Last 24 Hrs  T(C): 36.8 (07 Jun 2024 05:00), Max: 37.3 (06 Jun 2024 17:33)  T(F): 98.2 (07 Jun 2024 05:00), Max: 99.1 (06 Jun 2024 17:33)  HR: 51 (07 Jun 2024 05:00) (50 - 83)  BP: 162/91 (07 Jun 2024 05:00) (130/57 - 165/79)  BP(mean): 79 (06 Jun 2024 22:45) (79 - 99)  RR: 18 (07 Jun 2024 05:00) (16 - 19)  SpO2: 99% (07 Jun 2024 05:00) (96% - 100%)    Parameters below as of 07 Jun 2024 05:00  Patient On (Oxygen Delivery Method): room air

## 2024-06-07 NOTE — DISCHARGE NOTE PROVIDER - NSFOLLOWUPCLINICS_GEN_ALL_ED_FT
[facility]  Psychiatry  [address] [place] [address]  Phone: [phone]  Fax:   Follow Up Time: Routine

## 2024-06-07 NOTE — DISCHARGE NOTE PROVIDER - CARE PROVIDER_API CALL
Yessi Colon  Psychiatry  [address] [place] [address]  Phone: [phone]  Fax: [phone]  Follow Up Time: Routine

## 2024-06-07 NOTE — BH CONSULTATION LIAISON ASSESSMENT NOTE - SUMMARY
Christy Rojas is a 79 F, domiciled with , with home health aid, with at least one son, past psychiatric hx of depression and anxiety, no known psychiatric hospitalizations, no known SA or NSSIB, no known substance use, no known legal issues or hx of violence, past medical hx of HTN/HLD and hypothyroidism, who presented to the ED on 6/6 with AMS now admitted to medicine. Psychiatry consulted for management of anxiety.    Patient with hx of anxiety prescribed Xanax 0.5mg starting 08/2023 - anxiety recently exacerbated with what appears to be panic attacks in setting of recent cognitive impairment, stress at home related to health of , possibly withdrawal from short-acting xanax in between doses. Would consider clonazepam 0.5mg po BID for extended effect with goal to taper off and recommend follow-up in geriatric psychiatric clinic.   Christy Rojas is a 79 F, domiciled with , with home health aid, with at least one son, past psychiatric hx of depression and anxiety, no known psychiatric hospitalizations, no known SA or NSSIB, no known substance use, no known legal issues or hx of violence, past medical hx of HTN/HLD and hypothyroidism, who presented to the ED on 6/6 with AMS now admitted to medicine. Psychiatry consulted for management of anxiety.    Patient with hx of anxiety prescribed sertraline and Xanax 0.5mg starting 08/2023 - anxiety recently exacerbated with what appears to be panic attacks in setting of recent cognitive impairment, stress at home related to health of , possibly withdrawal from short-acting xanax in between doses. Would consider clonazepam 0.5mg po BID for extended effect with goal to taper off and recommend follow-up in geriatric psychiatric clinic.   Christy Rojas is a 79 F, domiciled with , with home health aid, with at least one son, past psychiatric hx of depression and anxiety, no known psychiatric hospitalizations, no known SA or NSSIB, no known substance use, no known legal issues or hx of violence, past medical hx of HTN/HLD and hypothyroidism, who presented to the ED on 6/6 with AMS now admitted to medicine. Psychiatry consulted for management of anxiety.    Patient with hx of anxiety prescribed sertraline and Xanax 0.5mg starting 08/2023 - anxiety recently exacerbated with what appears to be panic attacks in setting of recent cognitive impairment, stress at home related to health of , possibly withdrawal from short-acting xanax in between doses. Would consider clonazepam 0.25mg po BID for extended effect with goal to taper off and recommend follow-up in geriatric psychiatric clinic.

## 2024-06-07 NOTE — CONSULT NOTE ADULT - SUBJECTIVE AND OBJECTIVE BOX
Neurology Consult    Reason for Consult: Patient is a 79y old  Female who presents with a chief complaint ofAMS     HPI:  79F PMHx HTN, anxiety Brought in by family for AMS for the past few months, acutely worsened past 3days; she intermittently gets confused and does not remember what happened.     In the ED, VSS.   CBC. coag, cmp, UA unremarkable. limited rvp neg. CTH w/ chronic ischemic changes, no acute path. CXR clear lungs.     On interview, pt is not in distress. Makes eye contact but is not very communicative, answering yes and no only.  (06 Jun 2024 23:16)       PAST MEDICAL & SURGICAL HISTORY:  Hypertension      Anxiety      Hypothyroidism      Hyperlipidemia          Allergies: Allergies    sulfa drugs (Unknown)  ACE inhibitors (Unknown)    Intolerances        Social History: Denies toxic habits including tobacco, ETOH or illicit drugs.    Family History: FAMILY HISTORY:  . No family history of strokes    Medications: MEDICATIONS  (STANDING):  aspirin enteric coated 81 milliGRAM(s) Oral daily  atorvastatin 20 milliGRAM(s) Oral at bedtime  metoprolol succinate ER 25 milliGRAM(s) Oral daily  sertraline 50 milliGRAM(s) Oral daily  thyroid 60 milliGRAM(s) Oral daily  valsartan 160 milliGRAM(s) Oral two times a day    MEDICATIONS  (PRN):  acetaminophen     Tablet .. 650 milliGRAM(s) Oral every 6 hours PRN Temp greater or equal to 38C (100.4F), Mild Pain (1 - 3)  ALPRAZolam 0.25 milliGRAM(s) Oral at bedtime PRN anxiety  aluminum hydroxide/magnesium hydroxide/simethicone Suspension 30 milliLiter(s) Oral every 4 hours PRN Dyspepsia  melatonin 3 milliGRAM(s) Oral at bedtime PRN Insomnia  ondansetron Injectable 4 milliGRAM(s) IV Push every 8 hours PRN Nausea and/or Vomiting      Review of Systems:  CONSTITUTIONAL:  No weight loss, fever, chills, weakness or fatigue.  HEENT:  Eyes:  No visual loss, blurred vision, double vision or yellow sclera. Ears, Nose, Throat:  No hearing loss, sneezing, congestion, runny nose or sore throat.  SKIN:  No rash or itching.  CARDIOVASCULAR:  No chest pain, chest pressure or chest discomfort. No palpitations or edema.  RESPIRATORY:  No shortness of breath, cough or sputum.  GASTROINTESTINAL:  No anorexia, nausea, vomiting or diarrhea. No abdominal pain or blood.  GENITOURINARY:  No burning on urination or incontinence   NEUROLOGICAL:  No headache, dizziness, syncope, paralysis, ataxia, numbness or tingling in the extremities. No change in bowel or bladder control. no limb weakness. no vision changes. some delusions and AMS   MUSCULOSKELETAL:  No muscle, back pain, joint pain or stiffness.  HEMATOLOGIC:  No anemia, bleeding or bruising.  LYMPHATICS:  No enlarged nodes. No history of splenectomy.  PSYCHIATRIC:  No history of depression or anxiety.  ENDOCRINOLOGIC:  No reports of sweating, cold or heat intolerance. No polyuria or polydipsia.      Vitals:  Vital Signs Last 24 Hrs  T(C): 36.8 (07 Jun 2024 05:00), Max: 37.3 (06 Jun 2024 17:33)  T(F): 98.2 (07 Jun 2024 05:00), Max: 99.1 (06 Jun 2024 17:33)  HR: 51 (07 Jun 2024 05:00) (50 - 83)  BP: 162/91 (07 Jun 2024 05:00) (130/57 - 165/79)  BP(mean): 79 (06 Jun 2024 22:45) (79 - 99)  RR: 18 (07 Jun 2024 05:00) (16 - 19)  SpO2: 99% (07 Jun 2024 05:00) (96% - 100%)    Parameters below as of 07 Jun 2024 05:00  Patient On (Oxygen Delivery Method): room air        General Exam:   General Appearance: Appropriately dressed and in no acute distress       Head: Normocephalic, atraumatic and no dysmorphic features  Ear, Nose, and Throat: Moist mucous membranes  CVS: S1S2+  Resp: No SOB, no wheeze or rhonchi  GI: soft NT/ND  Extremities: No edema or cyanosis  Skin: No bruises or rashes     Neurological Exam:  Mental Status: Awake, alert and oriented x 2-3.  Able to follow simple and complex verbal commands. Able to name and repeat. fluent speech. No obvious aphasia or dysarthria noted.   Cranial Nerves: PERRL, EOMI, VFFC, sensation V1-V3 intact,  no obvious facial asymmetry, equal elevation of palate, scm/trap 5/5, tongue is midline on protrusion. no obvious papilledema on fundoscopic exam. hearing is grossly intact.   Motor: Normal bulk, tone and strength throughout. Fine finger movements were intact and symmetric. no tremors or drift noted.    Sensation: Intact to light touch and pinprick throughout. no right/left confusion. no extinction to tactile on DSS.    Reflexes: 1+ throughout at biceps, brachioradialis, triceps, patellars and ankles bilaterally and equal. No clonus. R toe and L toe were both downgoing.  Coordination: No dysmetria on FNF    Gait: deferred     Data/Labs/Imaging which I personally reviewed.     Labs:     CBC Full  -  ( 07 Jun 2024 07:23 )  WBC Count : 7.85 K/uL  RBC Count : 4.25 M/uL  Hemoglobin : 13.4 g/dL  Hematocrit : 42.1 %  Platelet Count - Automated : 317 K/uL  Mean Cell Volume : 99.1 fl  Mean Cell Hemoglobin : 31.5 pg  Mean Cell Hemoglobin Concentration : 31.8 gm/dL  Auto Neutrophil # : x  Auto Lymphocyte # : x  Auto Monocyte # : x  Auto Eosinophil # : x  Auto Basophil # : x  Auto Neutrophil % : x  Auto Lymphocyte % : x  Auto Monocyte % : x  Auto Eosinophil % : x  Auto Basophil % : x    06-07    141  |  107  |  16  ----------------------------<  88  3.7   |  22  |  0.57    Ca    9.4      07 Jun 2024 06:46  Phos  3.3     06-07  Mg     2.4     06-07    TPro  7.0  /  Alb  3.9  /  TBili  0.2  /  DBili  x   /  AST  22  /  ALT  18  /  AlkPhos  93  06-06    LIVER FUNCTIONS - ( 06 Jun 2024 15:18 )  Alb: 3.9 g/dL / Pro: 7.0 g/dL / ALK PHOS: 93 U/L / ALT: 18 U/L / AST: 22 U/L / GGT: x             Urinalysis Basic - ( 07 Jun 2024 06:46 )    Color: x / Appearance: x / SG: x / pH: x  Gluc: 88 mg/dL / Ketone: x  / Bili: x / Urobili: x   Blood: x / Protein: x / Nitrite: x   Leuk Esterase: x / RBC: x / WBC x   Sq Epi: x / Non Sq Epi: x / Bacteria: x    < from: CT Head No Cont (06.06.24 @ 19:51) >    ACC: [id number] EXAM:  CT BRAIN   ORDERED BY:  CHADWICK PEREZ     PROCEDURE DATE:  06/06/2024          INTERPRETATION:  CT head without IV contrast    CLINICAL INFORMATION:     ams  MCT    TECHNIQUE:  Contiguous axial 3 mm thick images were acquired.This data   set was reconstructed in the sagittal and coronal planes.  Contrast was   not administered for this examination.  CONTRAST:    None  DOSE INFORMATION:   This scan was performed using automatic exposure   control (radiation dose reduction software) to obtain a diagnostic image   quality scan with patient dose as low as reasonably achievable.   Total   DLP for this examination is estimated at 1008 mGy*cm.    COMPARISON:   No relevant prior examinations are available for review.    FINDINGS:    BRAIN:    The brain  demonstrates relatively mild largely symmetric   indistinct abnormal diminished attenuation within the deep cerebral   hemispheric white matter.  This finding is most evident in the periatrial   and posterior periventricular white matter.  No cerebral cortical lesion   is recognized.   Cerebral cortical gray-white matter differentiation is   maintained.  No acute cerebral cortical infarct is seen.  No intracranial   hemorrhage is found.  No mass effect is found in the brain.    CSF SPACES:  The ventricles, sulci and basal cisterns appear mild to   moderately dilated reflecting diffuse brain volume loss.   No   differential cerebral cortical atrophy is recognized.    VESSELS: Intracranial vasculature is also significant for atherosclerotic   calcifications within the cavernous and clinoid segments of the internal   carotid arteries. There is also involvement of the right vertebral artery.    HEAD AND NECK STRUCTURES:  The orbits are unremarkable.  The included   paranasal sinuses  are clear.  The nasal septum deviates left-to-right   causing stenosis of the right naris.  The nasopharynx is symmetric.  The   central skull base is intact.  The temporal bones demonstrate patent   petrous air cells.  The calvarium appears unremarkable.      IMPRESSION:   Unremarkable head CT.    Ischemic white matter disease and   atrophy typical for age. Intracranial atherosclerosis.      Preliminary report provided    --- End of Report ---          MEREDTIH FUCHS MD; Resident Radiologist  This document has been electronically signed.  ALLEN ROMERO MD; Attending Radiologist  This document has been electronically signed. Jun 6 2024  8:49PM    < end of copied text >

## 2024-06-07 NOTE — CONSULT NOTE ADULT - SUBJECTIVE AND OBJECTIVE BOX
CHIEF COMPLAINT:Patient is a 79y old  Female who presents with a chief complaint of     HISTORY OF PRESENT ILLNESS:    79 female with history as below   admitted with AMS  ROS otherwise neg     PAST MEDICAL & SURGICAL HISTORY:  Hypertension      Anxiety      Hypothyroidism      Hyperlipidemia              MEDICATIONS:  aspirin enteric coated 81 milliGRAM(s) Oral daily  valsartan 160 milliGRAM(s) Oral two times a day        acetaminophen     Tablet .. 650 milliGRAM(s) Oral every 6 hours PRN  ALPRAZolam 0.25 milliGRAM(s) Oral at bedtime PRN  melatonin 3 milliGRAM(s) Oral at bedtime PRN  ondansetron Injectable 4 milliGRAM(s) IV Push every 8 hours PRN  sertraline 50 milliGRAM(s) Oral daily    aluminum hydroxide/magnesium hydroxide/simethicone Suspension 30 milliLiter(s) Oral every 4 hours PRN    atorvastatin 20 milliGRAM(s) Oral at bedtime  thyroid 60 milliGRAM(s) Oral daily        FAMILY HISTORY:      Non-contributory    SOCIAL HISTORY:    No tobacco, drugs or etoh    Allergies    sulfa drugs (Unknown)  ACE inhibitors (Unknown)    Intolerances    	    REVIEW OF SYSTEMS:  as above  The rest of the 14 points ROS reviewed and except above they are unremarkable.        PHYSICAL EXAM:  T(C): 36.8 (06-07-24 @ 05:00), Max: 37.3 (06-06-24 @ 17:33)  HR: 51 (06-07-24 @ 05:00) (50 - 83)  BP: 162/91 (06-07-24 @ 05:00) (130/57 - 165/79)  RR: 18 (06-07-24 @ 05:00) (16 - 19)  SpO2: 99% (06-07-24 @ 05:00) (96% - 100%)  Wt(kg): --  I&O's Summary    JVP: Normal  Neck: supple  Lung: clear   CV: S1 S2 , Murmur:  Abd: soft  Ext: No edema  neuro: Awake / alert  Psych: flat affect  Skin: normal    LABS/DATA:    TELEMETRY: 	    ECG:  	   	  CARDIAC MARKERS:                                      13.4   7.85  )-----------( 317      ( 07 Jun 2024 07:23 )             42.1     06-07    141  |  107  |  16  ----------------------------<  88  3.7   |  22  |  0.57    Ca    9.4      07 Jun 2024 06:46  Phos  3.3     06-07  Mg     2.4     06-07    TPro  7.0  /  Alb  3.9  /  TBili  0.2  /  DBili  x   /  AST  22  /  ALT  18  /  AlkPhos  93  06-06    proBNP:   Lipid Profile:   HgA1c:   TSH: Thyroid Stimulating Hormone, Serum: 3.55 uIU/mL (06-07 @ 07:08)

## 2024-06-07 NOTE — DISCHARGE NOTE PROVIDER - NSDCMRMEDTOKEN_GEN_ALL_CORE_FT
ALPRAZOLAM 0.5MG TAB: half a tab (0.25mg)  aspirin 81 mg oral tablet: orally once a day  ATORVASTATIN 20MG TAB:   METOPROLOL ER 25MG  TAB: TAKE 1 TABLET BY MOUTH ONCE DAILY  NP THYROID 60MG TAB:   SERTRALINE 50MG TAB:   VALSARTAN 160MG TAB:    acetaminophen 325 mg oral tablet: 2 tab(s) orally every 6 hours As needed Temp greater or equal to 38C (100.4F), Mild Pain (1 - 3)  aluminum hydroxide-magnesium hydroxide 200 mg-200 mg/5 mL oral suspension: 30 milliliter(s) orally every 4 hours As needed Dyspepsia  aspirin 81 mg oral delayed release tablet: 1 tab(s) orally once a day  atorvastatin 20 mg oral tablet: 1 tab(s) orally once a day (at bedtime)  clonazePAM 0.25 mg oral tablet, disintegratin tab(s) orally 2 times a day MDD: 2 tabs  melatonin 3 mg oral tablet: 1 tab(s) orally once a day (at bedtime) As needed Insomnia  sertraline 50 mg oral tablet: 1 tab(s) orally once a day  thyroid desiccated 60 mg oral tablet: 1 tab(s) orally once a day  valsartan 160 mg oral tablet: 1 tab(s) orally 2 times a day

## 2024-06-08 ENCOUNTER — TRANSCRIPTION ENCOUNTER (OUTPATIENT)
Age: 79
End: 2024-06-08

## 2024-06-08 VITALS
SYSTOLIC BLOOD PRESSURE: 143 MMHG | OXYGEN SATURATION: 96 % | RESPIRATION RATE: 18 BRPM | DIASTOLIC BLOOD PRESSURE: 86 MMHG | TEMPERATURE: 98 F | HEART RATE: 57 BPM

## 2024-06-08 LAB
T PALLIDUM AB TITR SER: NEGATIVE — SIGNIFICANT CHANGE UP
TSH SERPL-MCNC: 3.37 UIU/ML — SIGNIFICANT CHANGE UP (ref 0.27–4.2)
VIT B12 SERPL-MCNC: 568 PG/ML — SIGNIFICANT CHANGE UP (ref 232–1245)

## 2024-06-08 PROCEDURE — 83735 ASSAY OF MAGNESIUM: CPT

## 2024-06-08 PROCEDURE — 85018 HEMOGLOBIN: CPT

## 2024-06-08 PROCEDURE — 70450 CT HEAD/BRAIN W/O DYE: CPT | Mod: MC

## 2024-06-08 PROCEDURE — 85025 COMPLETE CBC W/AUTO DIFF WBC: CPT

## 2024-06-08 PROCEDURE — 83605 ASSAY OF LACTIC ACID: CPT

## 2024-06-08 PROCEDURE — 84100 ASSAY OF PHOSPHORUS: CPT

## 2024-06-08 PROCEDURE — 81001 URINALYSIS AUTO W/SCOPE: CPT

## 2024-06-08 PROCEDURE — 84443 ASSAY THYROID STIM HORMONE: CPT

## 2024-06-08 PROCEDURE — 85027 COMPLETE CBC AUTOMATED: CPT

## 2024-06-08 PROCEDURE — 99285 EMERGENCY DEPT VISIT HI MDM: CPT | Mod: 25

## 2024-06-08 PROCEDURE — 80053 COMPREHEN METABOLIC PANEL: CPT

## 2024-06-08 PROCEDURE — 71045 X-RAY EXAM CHEST 1 VIEW: CPT

## 2024-06-08 PROCEDURE — 93306 TTE W/DOPPLER COMPLETE: CPT

## 2024-06-08 PROCEDURE — 93005 ELECTROCARDIOGRAM TRACING: CPT

## 2024-06-08 PROCEDURE — 36415 COLL VENOUS BLD VENIPUNCTURE: CPT

## 2024-06-08 PROCEDURE — 84295 ASSAY OF SERUM SODIUM: CPT

## 2024-06-08 PROCEDURE — 95816 EEG AWAKE AND DROWSY: CPT

## 2024-06-08 PROCEDURE — 70551 MRI BRAIN STEM W/O DYE: CPT | Mod: MC

## 2024-06-08 PROCEDURE — 87086 URINE CULTURE/COLONY COUNT: CPT

## 2024-06-08 PROCEDURE — 87637 SARSCOV2&INF A&B&RSV AMP PRB: CPT

## 2024-06-08 PROCEDURE — 82803 BLOOD GASES ANY COMBINATION: CPT

## 2024-06-08 PROCEDURE — 82947 ASSAY GLUCOSE BLOOD QUANT: CPT

## 2024-06-08 PROCEDURE — 82607 VITAMIN B-12: CPT

## 2024-06-08 PROCEDURE — 82330 ASSAY OF CALCIUM: CPT

## 2024-06-08 PROCEDURE — 85014 HEMATOCRIT: CPT

## 2024-06-08 PROCEDURE — 82435 ASSAY OF BLOOD CHLORIDE: CPT

## 2024-06-08 PROCEDURE — 80048 BASIC METABOLIC PNL TOTAL CA: CPT

## 2024-06-08 PROCEDURE — 86780 TREPONEMA PALLIDUM: CPT

## 2024-06-08 PROCEDURE — 84132 ASSAY OF SERUM POTASSIUM: CPT

## 2024-06-08 RX ORDER — ACETAMINOPHEN 500 MG
2 TABLET ORAL
Qty: 0 | Refills: 0 | DISCHARGE
Start: 2024-06-08

## 2024-06-08 RX ORDER — VALSARTAN 80 MG/1
0 TABLET ORAL
Qty: 0 | Refills: 0 | DISCHARGE

## 2024-06-08 RX ORDER — CLONAZEPAM 1 MG
1 TABLET ORAL
Qty: 60 | Refills: 0
Start: 2024-06-08 | End: 2024-07-07

## 2024-06-08 RX ORDER — SERTRALINE 25 MG/1
0 TABLET, FILM COATED ORAL
Qty: 0 | Refills: 0 | DISCHARGE

## 2024-06-08 RX ORDER — SERTRALINE 25 MG/1
1 TABLET, FILM COATED ORAL
Qty: 0 | Refills: 0 | DISCHARGE
Start: 2024-06-08

## 2024-06-08 RX ORDER — ASPIRIN/CALCIUM CARB/MAGNESIUM 324 MG
1 TABLET ORAL
Qty: 0 | Refills: 0 | DISCHARGE
Start: 2024-06-08

## 2024-06-08 RX ORDER — ALPRAZOLAM 0.25 MG
0 TABLET ORAL
Refills: 0 | DISCHARGE

## 2024-06-08 RX ORDER — THYROID 120 MG
1 TABLET ORAL
Qty: 30 | Refills: 0
Start: 2024-06-08 | End: 2024-07-07

## 2024-06-08 RX ORDER — ASPIRIN/CALCIUM CARB/MAGNESIUM 324 MG
0 TABLET ORAL
Refills: 0 | DISCHARGE

## 2024-06-08 RX ORDER — ATORVASTATIN CALCIUM 80 MG/1
0 TABLET, FILM COATED ORAL
Qty: 0 | Refills: 0 | DISCHARGE

## 2024-06-08 RX ORDER — LANOLIN ALCOHOL/MO/W.PET/CERES
1 CREAM (GRAM) TOPICAL
Qty: 0 | Refills: 0 | DISCHARGE
Start: 2024-06-08

## 2024-06-08 RX ORDER — METOPROLOL TARTRATE 50 MG
0 TABLET ORAL
Qty: 0 | Refills: 2 | DISCHARGE

## 2024-06-08 RX ORDER — ATORVASTATIN CALCIUM 80 MG/1
1 TABLET, FILM COATED ORAL
Qty: 0 | Refills: 0 | DISCHARGE
Start: 2024-06-08

## 2024-06-08 RX ORDER — VALSARTAN 80 MG/1
1 TABLET ORAL
Qty: 60 | Refills: 0
Start: 2024-06-08 | End: 2024-07-07

## 2024-06-08 RX ORDER — THYROID 120 MG
0 TABLET ORAL
Qty: 0 | Refills: 0 | DISCHARGE

## 2024-06-08 RX ADMIN — SERTRALINE 50 MILLIGRAM(S): 25 TABLET, FILM COATED ORAL at 12:49

## 2024-06-08 RX ADMIN — HEPARIN SODIUM 5000 UNIT(S): 5000 INJECTION INTRAVENOUS; SUBCUTANEOUS at 05:42

## 2024-06-08 RX ADMIN — Medication 60 MILLIGRAM(S): at 05:41

## 2024-06-08 RX ADMIN — Medication 81 MILLIGRAM(S): at 12:49

## 2024-06-08 RX ADMIN — Medication 0.25 MILLIGRAM(S): at 05:42

## 2024-06-08 RX ADMIN — VALSARTAN 160 MILLIGRAM(S): 80 TABLET ORAL at 05:41

## 2024-06-08 NOTE — DISCHARGE NOTE NURSING/CASE MANAGEMENT/SOCIAL WORK - PATIENT PORTAL LINK FT
You can access the FollowMyHealth Patient Portal offered by [facility] by registering at the following website: [email or web address]. By joining [facility]’s FollowMyHealth portal, you will also be able to view your health information using other applications (apps) compatible with our system.

## 2024-06-08 NOTE — DISCHARGE NOTE NURSING/CASE MANAGEMENT/SOCIAL WORK - NSDCPEFALRISK_GEN_ALL_CORE
For information on Fall & Injury Prevention, visit: [email or web address] OR  [email or web address] OR  https://www.cdc.gov/steadi/patient.html

## 2024-06-08 NOTE — PROGRESS NOTE ADULT - PROBLEM SELECTOR PLAN 1
-dementia vs. metabolic encephalopathy.   - no underlying metabolic cause  - psych related, psych eval called   -likely dementia given chronic ischemic changes on CTH, progressively worsening cognitive impairment. No infectious s/s and cbc and ua neg.   -monitor for now.  -f/u TSH, normal   - oral benzo BID for anxiety
-dementia vs. metabolic encephalopathy.   - no underlying metabolic cause  - psych related, psych eval called   -likely dementia given chronic ischemic changes on CTH, progressively worsening cognitive impairment. No infectious s/s and cbc and ua neg.   -monitor for now.  -f/u TSH, normal   - oral benzo BID for anxiety

## 2024-06-08 NOTE — PROGRESS NOTE ADULT - ASSESSMENT
Bradycardia   possible from PACs not picked up on pulse ox   would benefit from Tele to make sure if HR truly is sinus esteban or falsely low from pacs  echo unremarkable    HTN  BP labile  for now cont current meds    AMS  MRI unremarkable   work up as per neurology / medicine / psych   normal TSH
79F admitted for cognitive impairment. 
79F admitted for cognitive impairment.

## 2024-06-08 NOTE — PROGRESS NOTE ADULT - SUBJECTIVE AND OBJECTIVE BOX
Subjective: Patient seen and examined. No new events except as noted.     SUBJECTIVE/ROS:  No chest pain, dyspnea, palpitation, or dizziness.       MEDICATIONS:  MEDICATIONS  (STANDING):  aspirin enteric coated 81 milliGRAM(s) Oral daily  atorvastatin 20 milliGRAM(s) Oral at bedtime  clonazePAM  Tablet 0.25 milliGRAM(s) Oral two times a day  heparin   Injectable 5000 Unit(s) SubCutaneous every 8 hours  sertraline 50 milliGRAM(s) Oral daily  thyroid 60 milliGRAM(s) Oral daily  valsartan 160 milliGRAM(s) Oral two times a day      PHYSICAL EXAM:  T(C): 36.5 (06-08-24 @ 05:18), Max: 36.8 (06-07-24 @ 20:40)  HR: 54 (06-08-24 @ 05:18) (54 - 63)  BP: 142/84 (06-08-24 @ 05:18) (135/76 - 142/84)  RR: 18 (06-08-24 @ 05:18) (18 - 18)  SpO2: 99% (06-08-24 @ 05:18) (98% - 99%)  Wt(kg): --  I&O's Summary          JVP: Normal  Neck: supple  Lung: clear   CV: S1 S2 , Murmur:  Abd: soft  Ext: No edema  neuro: Awake / alert  Psych: flat affect  Skin: normal``    LABS/DATA:    CARDIAC MARKERS:                                13.4   7.85  )-----------( 317      ( 07 Jun 2024 07:23 )             42.1     06-07    141  |  107  |  16  ----------------------------<  88  3.7   |  22  |  0.57    Ca    9.4      07 Jun 2024 06:46  Phos  3.3     06-07  Mg     2.4     06-07    TPro  7.0  /  Alb  3.9  /  TBili  0.2  /  DBili  x   /  AST  22  /  ALT  18  /  AlkPhos  93  06-06    proBNP:   Lipid Profile:   HgA1c:   TSH:     TELE:  EKG:        
Name of Patient : YRIS AZUL  MRN: [id number]        Subjective: Patient seen and examined. No new events except as noted.   anxious  family at the bedside     REVIEW OF SYSTEMS:    CONSTITUTIONAL: No weakness, fevers or chills  EYES/ENT: No visual changes;  No vertigo or throat pain   NECK: No pain or stiffness  RESPIRATORY: No cough, wheezing, hemoptysis; No shortness of breath  CARDIOVASCULAR: No chest pain or palpitations  GASTROINTESTINAL: No abdominal or epigastric pain. No nausea, vomiting, or hematemesis; No diarrhea or constipation. No melena or hematochezia.  GENITOURINARY: No dysuria, frequency or hematuria  NEUROLOGICAL: No numbness or weakness  SKIN: No itching, burning, rashes, or lesions   All other review of systems is negative unless indicated above.    MEDICATIONS:  MEDICATIONS  (STANDING):  aspirin enteric coated 81 milliGRAM(s) Oral daily  atorvastatin 20 milliGRAM(s) Oral at bedtime  clonazePAM  Tablet 0.25 milliGRAM(s) Oral two times a day  heparin   Injectable 5000 Unit(s) SubCutaneous every 8 hours  sertraline 50 milliGRAM(s) Oral daily  thyroid 60 milliGRAM(s) Oral daily  valsartan 160 milliGRAM(s) Oral two times a day      PHYSICAL EXAM:  T(C): 36.8 (06-07-24 @ 20:40), Max: 36.8 (06-07-24 @ 05:00)  HR: 63 (06-07-24 @ 20:40) (50 - 83)  BP: 135/76 (06-07-24 @ 20:40) (135/76 - 165/79)  RR: 18 (06-07-24 @ 20:40) (18 - 18)  SpO2: 98% (06-07-24 @ 20:40) (96% - 99%)  Wt(kg): --  I&O's Summary        Appearance: awake, AOx4, anxious   HEENT:  PERRLA   Lymphatic: No lymphadenopathy   Cardiovascular: Normal S1 S2, no JVD  Respiratory: normal effort , clear  Gastrointestinal:  Soft, Non-tender  Skin: No rashes,  warm to touch  Psychiatry:  Mood & affect appropriate  Musculuskeletal: No edema    recent labs, Imaging and EKGs personally reviewed                           13.4   7.85  )-----------( 317      ( 07 Jun 2024 07:23 )             42.1               06-07    141  |  107  |  16  ----------------------------<  88  3.7   |  22  |  0.57    Ca    9.4      07 Jun 2024 06:46  Phos  3.3     06-07  Mg     2.4     06-07    TPro  7.0  /  Alb  3.9  /  TBili  0.2  /  DBili  x   /  AST  22  /  ALT  18  /  AlkPhos  93  06-06                       Urinalysis Basic - ( 07 Jun 2024 06:46 )    Color: x / Appearance: x / SG: x / pH: x  Gluc: 88 mg/dL / Ketone: x  / Bili: x / Urobili: x   Blood: x / Protein: x / Nitrite: x   Leuk Esterase: x / RBC: x / WBC x   Sq Epi: x / Non Sq Epi: x / Bacteria: x                    
Name of Patient : YRIS AZUL  MRN: [id number]  Date of visit: 06-08-24       Subjective: Patient seen and examined. No new events except as noted.   awake, anxious       REVIEW OF SYSTEMS:    CONSTITUTIONAL: No weakness, fevers or chills  EYES/ENT: No visual changes;  No vertigo or throat pain   NECK: No pain or stiffness  RESPIRATORY: No cough, wheezing, hemoptysis; No shortness of breath  CARDIOVASCULAR: No chest pain or palpitations  GASTROINTESTINAL: No abdominal or epigastric pain.   GENITOURINARY: No dysuria, frequency or hematuria  NEUROLOGICAL: No numbness or weakness  SKIN: No itching, burning, rashes, or lesions   All other review of systems is negative unless indicated above.    MEDICATIONS:  MEDICATIONS  (STANDING):  aspirin enteric coated 81 milliGRAM(s) Oral daily  atorvastatin 20 milliGRAM(s) Oral at bedtime  clonazePAM  Tablet 0.25 milliGRAM(s) Oral two times a day  heparin   Injectable 5000 Unit(s) SubCutaneous every 8 hours  sertraline 50 milliGRAM(s) Oral daily  thyroid 60 milliGRAM(s) Oral daily  valsartan 160 milliGRAM(s) Oral two times a day      PHYSICAL EXAM:  T(C): 36.4 (06-08-24 @ 15:10), Max: 36.5 (06-08-24 @ 05:18)  HR: 57 (06-08-24 @ 15:10) (54 - 63)  BP: 143/86 (06-08-24 @ 15:10) (132/67 - 143/86)  RR: 18 (06-08-24 @ 15:10) (18 - 18)  SpO2: 96% (06-08-24 @ 15:10) (96% - 99%)  Wt(kg): --  I&O's Summary    08 Jun 2024 07:01  -  08 Jun 2024 21:19  --------------------------------------------------------  IN: 480 mL / OUT: 0 mL / NET: 480 mL          Appearance: Normal	  HEENT:  PERRLA   Lymphatic: No lymphadenopathy   Cardiovascular: Normal S1 S2, no JVD  Respiratory: normal effort , clear  Gastrointestinal:  Soft, Non-tender  Skin: No rashes,  warm to touch  Psychiatry:  Mood & affect appropriate  Musculuskeletal: No edema    recent labs, Imaging and EKGs personally reviewed     06-08-24 @ 07:01  -  06-08-24 @ 21:19  --------------------------------------------------------  IN: 480 mL / OUT: 0 mL / NET: 480 mL                          13.4   7.85  )-----------( 317      ( 07 Jun 2024 07:23 )             42.1               06-07    141  |  107  |  16  ----------------------------<  88  3.7   |  22  |  0.57    Ca    9.4      07 Jun 2024 06:46  Phos  3.3     06-07  Mg     2.4     06-07                         Urinalysis Basic - ( 07 Jun 2024 06:46 )    Color: x / Appearance: x / SG: x / pH: x  Gluc: 88 mg/dL / Ketone: x  / Bili: x / Urobili: x   Blood: x / Protein: x / Nitrite: x   Leuk Esterase: x / RBC: x / WBC x   Sq Epi: x / Non Sq Epi: x / Bacteria: x

## 2024-06-08 NOTE — PROGRESS NOTE ADULT - PROBLEM SELECTOR PLAN 4
-c/w toprol and valsartan  - bradycardia noted, hold lopressor and monitor  - card eval called
-c/w toprol and valsartan  - bradycardia noted, hold lopressor and monitor  - card eval called